# Patient Record
Sex: MALE | Race: WHITE
[De-identification: names, ages, dates, MRNs, and addresses within clinical notes are randomized per-mention and may not be internally consistent; named-entity substitution may affect disease eponyms.]

---

## 2022-01-03 ENCOUNTER — HOSPITAL ENCOUNTER (EMERGENCY)
Dept: HOSPITAL 53 - M ED | Age: 67
Discharge: HOME | End: 2022-01-03
Payer: MEDICARE

## 2022-01-03 VITALS
HEIGHT: 74 IN | DIASTOLIC BLOOD PRESSURE: 77 MMHG | SYSTOLIC BLOOD PRESSURE: 148 MMHG | WEIGHT: 205.03 LBS | BODY MASS INDEX: 26.31 KG/M2

## 2022-01-03 DIAGNOSIS — J01.10: Primary | ICD-10-CM

## 2022-01-03 DIAGNOSIS — J20.9: ICD-10-CM

## 2022-01-03 DIAGNOSIS — F17.200: ICD-10-CM

## 2022-01-03 NOTE — REP
INDICATION:

wheezing, cough, eval for pneumonia.



COMPARISON:

None.



TECHNIQUE:

PA and lateral



FINDINGS:

The superior mediastinal structures are midline.  The cardiac silhouette is

unremarkable in size, shape, and position.  The diaphragmatic surfaces of the lungs

are regular, and the costophrenic angles are clear.  The pulmonary fields are clear.

The imaged osseous structures are intact.





IMPRESSION:

There is no acute cardiopulmonary disease.





<Electronically signed by Kieran Rowe > 01/03/22 1011

## 2025-04-18 ENCOUNTER — HOSPITAL ENCOUNTER (OUTPATIENT)
Dept: HOSPITAL 53 - M WUC | Age: 70
End: 2025-04-18
Attending: NURSE PRACTITIONER
Payer: MEDICARE

## 2025-04-18 DIAGNOSIS — Z01.812: Primary | ICD-10-CM

## 2025-04-18 LAB — CREAT SERPL-MCNC: 0.96 MG/DL (ref 0.7–1.3)

## 2025-04-28 ENCOUNTER — HOSPITAL ENCOUNTER (OUTPATIENT)
Dept: HOSPITAL 53 - M RAD | Age: 70
End: 2025-04-28
Attending: NURSE PRACTITIONER
Payer: MEDICARE

## 2025-04-28 DIAGNOSIS — R42: Primary | ICD-10-CM

## 2025-04-28 PROCEDURE — 70498 CT ANGIOGRAPHY NECK: CPT

## 2025-04-28 PROCEDURE — 70496 CT ANGIOGRAPHY HEAD: CPT
